# Patient Record
Sex: FEMALE | Race: WHITE
[De-identification: names, ages, dates, MRNs, and addresses within clinical notes are randomized per-mention and may not be internally consistent; named-entity substitution may affect disease eponyms.]

---

## 2020-06-06 ENCOUNTER — HOSPITAL ENCOUNTER (EMERGENCY)
Dept: HOSPITAL 96 - M.ERS | Age: 50
Discharge: HOME | End: 2020-06-06
Payer: COMMERCIAL

## 2020-06-06 VITALS — SYSTOLIC BLOOD PRESSURE: 141 MMHG | DIASTOLIC BLOOD PRESSURE: 70 MMHG

## 2020-06-06 VITALS — WEIGHT: 175 LBS | BODY MASS INDEX: 33.04 KG/M2 | HEIGHT: 61 IN

## 2020-06-06 DIAGNOSIS — F17.210: ICD-10-CM
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
## 2020-06-06 DIAGNOSIS — Z98.0: ICD-10-CM

## 2020-06-06 DIAGNOSIS — R56.9: ICD-10-CM
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
## 2020-06-06 DIAGNOSIS — Z88.8: ICD-10-CM

## 2020-06-06 DIAGNOSIS — S40.861A: ICD-10-CM

## 2020-06-06 DIAGNOSIS — Z91.09: ICD-10-CM

## 2020-06-06 DIAGNOSIS — L03.113: Primary | ICD-10-CM

## 2020-08-18 ENCOUNTER — HOSPITAL ENCOUNTER (OUTPATIENT)
Dept: HOSPITAL 96 - M.MRI | Age: 50
End: 2020-08-18
Attending: FAMILY MEDICINE
Payer: COMMERCIAL

## 2020-08-18 DIAGNOSIS — R60.0: ICD-10-CM

## 2020-08-18 DIAGNOSIS — M25.411: Primary | ICD-10-CM

## 2020-08-18 DIAGNOSIS — M25.511: ICD-10-CM

## 2021-05-18 ENCOUNTER — HOSPITAL ENCOUNTER (INPATIENT)
Dept: HOSPITAL 96 - M.ERS | Age: 51
LOS: 2 days | Discharge: HOME | DRG: 246 | End: 2021-05-20
Attending: INTERNAL MEDICINE | Admitting: INTERNAL MEDICINE
Payer: COMMERCIAL

## 2021-05-18 VITALS — DIASTOLIC BLOOD PRESSURE: 51 MMHG | SYSTOLIC BLOOD PRESSURE: 98 MMHG

## 2021-05-18 VITALS — WEIGHT: 193 LBS | HEIGHT: 60.98 IN | BODY MASS INDEX: 36.44 KG/M2

## 2021-05-18 VITALS — SYSTOLIC BLOOD PRESSURE: 118 MMHG | DIASTOLIC BLOOD PRESSURE: 51 MMHG

## 2021-05-18 VITALS — DIASTOLIC BLOOD PRESSURE: 46 MMHG | SYSTOLIC BLOOD PRESSURE: 95 MMHG

## 2021-05-18 VITALS — DIASTOLIC BLOOD PRESSURE: 70 MMHG | SYSTOLIC BLOOD PRESSURE: 157 MMHG

## 2021-05-18 VITALS — SYSTOLIC BLOOD PRESSURE: 130 MMHG | DIASTOLIC BLOOD PRESSURE: 66 MMHG

## 2021-05-18 DIAGNOSIS — G43.909: ICD-10-CM

## 2021-05-18 DIAGNOSIS — E11.9: ICD-10-CM

## 2021-05-18 DIAGNOSIS — Z82.49: ICD-10-CM

## 2021-05-18 DIAGNOSIS — Z88.8: ICD-10-CM

## 2021-05-18 DIAGNOSIS — F17.200: ICD-10-CM

## 2021-05-18 DIAGNOSIS — I21.4: Primary | ICD-10-CM

## 2021-05-18 DIAGNOSIS — I50.33: ICD-10-CM

## 2021-05-18 DIAGNOSIS — I25.110: ICD-10-CM

## 2021-05-18 DIAGNOSIS — I35.1: ICD-10-CM

## 2021-05-18 DIAGNOSIS — M79.7: ICD-10-CM

## 2021-05-18 DIAGNOSIS — Z20.822: ICD-10-CM

## 2021-05-18 DIAGNOSIS — E78.5: ICD-10-CM

## 2021-05-18 DIAGNOSIS — Z79.899: ICD-10-CM

## 2021-05-18 DIAGNOSIS — Z90.49: ICD-10-CM

## 2021-05-18 LAB
ABSOLUTE BASOPHILS: 0.1 THOU/UL (ref 0–0.2)
ABSOLUTE EOSINOPHILS: 0.1 THOU/UL (ref 0–0.7)
ABSOLUTE MONOCYTES: 0.5 THOU/UL (ref 0–1.2)
ALBUMIN SERPL-MCNC: 3.5 G/DL (ref 3.4–5)
ALP SERPL-CCNC: 119 U/L (ref 46–116)
ALT SERPL-CCNC: 46 U/L (ref 30–65)
ANION GAP SERPL CALC-SCNC: 9 MMOL/L (ref 7–16)
APTT BLD: 25.2 SECONDS (ref 25–31.3)
AST SERPL-CCNC: 22 U/L (ref 15–37)
BASOPHILS NFR BLD AUTO: 1.1 %
BILIRUB SERPL-MCNC: 0.3 MG/DL
BUN SERPL-MCNC: 16 MG/DL (ref 7–18)
CALCIUM SERPL-MCNC: 9 MG/DL (ref 8.5–10.1)
CHLORIDE SERPL-SCNC: 101 MMOL/L (ref 98–107)
CHOLEST SERPL-MCNC: 222 MG/DL (ref ?–200)
CK-MB MASS: 1.1 NG/ML
CO2 SERPL-SCNC: 26 MMOL/L (ref 21–32)
CREAT SERPL-MCNC: 1.1 MG/DL (ref 0.6–1.3)
EOSINOPHIL NFR BLD: 1 %
GLUCOSE SERPL-MCNC: 337 MG/DL (ref 70–99)
GRANULOCYTES NFR BLD MANUAL: 71.3 %
HCT VFR BLD CALC: 49.8 % (ref 37–47)
HDLC SERPL-MCNC: 26 MG/DL (ref 40–?)
HGB BLD-MCNC: 17 GM/DL (ref 12–15)
INR PPP: 1
LDLC SERPL-MCNC: 140 MG/DL (ref ?–100)
LIPASE: 325 U/L (ref 73–393)
LYMPHOCYTES # BLD: 2 THOU/UL (ref 0.8–5.3)
LYMPHOCYTES NFR BLD AUTO: 21.1 %
MAGNESIUM SERPL-MCNC: 2 MG/DL (ref 1.8–2.4)
MCH RBC QN AUTO: 31.6 PG (ref 26–34)
MCHC RBC AUTO-ENTMCNC: 34.2 G/DL (ref 28–37)
MCV RBC: 92.4 FL (ref 80–100)
MONOCYTES NFR BLD: 5.5 %
MPV: 7.8 FL. (ref 7.2–11.1)
NEUTROPHILS # BLD: 6.7 THOU/UL (ref 1.6–8.1)
NT-PRO BRAIN NAT PEPTIDE: 71 PG/ML (ref ?–300)
NUCLEATED RBCS: 0 /100WBC
PLATELET COUNT*: 264 THOU/UL (ref 150–400)
POTASSIUM SERPL-SCNC: 4.4 MMOL/L (ref 3.5–5.1)
PROT SERPL-MCNC: 8.3 G/DL (ref 6.4–8.2)
PROTHROMBIN TIME: 10.3 SECONDS (ref 9.2–11.5)
RBC # BLD AUTO: 5.39 MIL/UL (ref 4.2–5)
RDW-CV: 12.9 % (ref 10.5–14.5)
SODIUM SERPL-SCNC: 136 MMOL/L (ref 136–145)
TC:HDL: 8.5 RATIO
TRIGL SERPL-MCNC: 280 MG/DL (ref ?–150)
VLDLC SERPL CALC-MCNC: 56 MG/DL (ref ?–40)
WBC # BLD AUTO: 9.3 THOU/UL (ref 4–11)

## 2021-05-18 NOTE — EKG
[place] [address]
Phone:  [phone]                     ELECTROCARDIOGRAM REPORT      
_______________________________________________________________________________
 
Name:         ROSENJAKUB             Room:          [address]    ADM IN 
M.R.#:    [id number]     Account #:     [id number]  
Admission:    21    Attend Phys:   Dajuan Espinoza
Discharge:                Date of Birth: [birth year]  
Date of Service: 21 0909  Report #:      [id number]
        [id number]
_______________________________________________________________________________
THIS REPORT FOR:  //name//                      
 
                         [facility] ED
                                       
Test Date:    2021               Test Time:    09:09:23
Pat Name:     JAKUB ROSEN            Department:   
Patient ID:   [id number]            Room:         [facility]
Gender:       F                        Technician:   NIKITA
:          1970               Requested By: Antonio Payne
Order Number: [id number] MD:   Gabino Bonilla
                                 Measurements
Intervals                              Axis          
Rate:         77                       P:            30
NJ:           171                      QRS:          68
QRSD:         90                       T:            34
QT:           395                                    
QTc:          448                                    
                           Interpretive Statements
Sinus rhythm
ST elevation suggests acute pericarditis
Compared to ECG 10/25/2017 14:06:21
ST (T wave) deviation now present
Sinus tachycardia no longer present
Electronically Signed On 2021 16:11:55 CDT by Gabino Bonilla
[email or web address]
 
 
 
 
 
 
 
 
 
 
 
 
 
 
 
 
 
 
 
  <ELECTRONICALLY SIGNED>
                                           By: Gabino Bonilla MD, [facility]      
  21     1611
D: 2109   _____________________________________
T: 21 0909   Gabino Bonilla MD, [facility]        /EPI

## 2021-05-18 NOTE — 2DMMODE
[place] [address]
Phone:  [phone] 2 D/M-MODE ECHOCARDIOGRAM     
_______________________________________________________________________________
 
Name:         JAKUB ROSEN             Room:          [address] [place] 
.R.#:    [id number]     Account #:     [id number]  
Admission:    21    Attend Phys:   Dajuan Espinoza
Discharge:                Date of Birth: [birth year]  
Date of Service: 21 1723  Report #:      [id number]
        [id number]
_______________________________________________________________________________
THIS REPORT FOR:
 
cc:  Emmy Delgadillo,Emmy Branch,Gabino SERRATO MD [facility]        
                                                                       ~
 
--------------- APPROVED REPORT --------------
 
 
Study performed:  2021 14:37:37
 
EXAM: Comprehensive 2D, Doppler, and color-flow 
Echocardiogram 
Patient Location: In-Patient   
Room #:  209     Status:  routine
 
      BSA:         1.74
HR: 64 bpm BP:          130/80 mmHg 
Rhythm: NSR     
 
Other Information 
Study Quality: Good
 
Indications
Chest Pain
 
2D Dimensions
IVSd:  8.71 (7-11mm) LVOT Diam:  19.13 (18-24mm) 
LVDd:  36.83 mm  
PWd:  7.96 (7-11mm) Ascending Ao:  25.24 (22-36mm)
LVDs:  23.95 (25-40mm) 
Aortic Root:  31.08 mm 
 
Volumes
Left Atrial Volume (Systole) 
    LA ESV Index:  15.10 mL/m2
 
Aortic Valve
AoV Peak Steve.:  1.15 m/s 
AO Peak Gr.:  5.28 mmHg  LVOT Max PG:  3.78 mmHg
AO Mean Gr.:  3.02 mmHg  LVOT Mean P.64 mmHg
    LVOT Max V:  0.97 m/s
AO V2 VTI:  23.86 cm  LVOT Mean V:  0.58 m/s
ASHLEY (VTI):  2.55 cm2  LVOT V1 VTI:  21.13 cm
AI [place]:  3.39 m/s2  
 
 
[place] [address]
Phone:  [phone]                     2 D/M-MODE ECHOCARDIOGRAM     
_______________________________________________________________________________
 
Name:         ROSENJAKUBAWA BOYD             Room:          [address] [place] 
M.R.#:    [id number]     Account #:     [id number]  
Admission:    21    Attend Phys:   Dajuan Espinoza
Discharge:                Date of Birth: [birth year]  
Date of Service: 21 1723  Report #:      [id number]
        [id number]
_______________________________________________________________________________
AI PHT:  356.28 ms  
 
Mitral Valve
    E/A Ratio:  1.10
    MV Decel. Time:  194.76 ms
MV E Max Steve.:  0.93 m/s 
MV PHT:  56.48 ms  
MVA (PHT):  3.90 cm2  
 
TDI
E/Lateral E':  8.45 E/Medial E':  9.30
   Medial E' Steve.:  0.10 m/s
   Lateral E' Steve.:  0.11 m/s
 
Pulmonary Valve
PV Peak Steve.:  0.65 m/s PV Peak Gr.:  1.69 mmHg
 
Tricuspid Valve
    RAP Estimate:  5.00 mmHg
TR Peak Gr.:  18.83 mmHg RVSP:  23.00 mmHg
    PA Pressure:  23.00 mmHg
 
Left Ventricle
The left ventricle is normal size. There is normal LV segmental wall 
motion. There is normal left ventricular wall thickness. Left 
ventricular systolic function is normal. The left ventricular 
ejection fraction is within the normal range. LVEF is 55-60%. The 
left ventricular diastolic function is normal.
 
Right Ventricle
The right ventricle is normal size. The right ventricular systolic 
function is normal.
 
Atria
The left atrium size is normal. The right atrium size is 
normal.
 
Aortic Valve
The aortic valve is normal in structure. Mild aortic regurgitation. 
There is no aortic valvular stenosis.
 
Mitral Valve
The mitral valve is normal in structure. Mild mitral regurgitation. 
No evidence of mitral valve stenosis.
 
Tricuspid Valve
 
 
[place] [address]
Phone:  [phone]                     2 D/M-MODE ECHOCARDIOGRAM     
_______________________________________________________________________________
 
Name:         JAKUB ROSEN             Room:          [address] [place] 
[facility]#:    [id number]     Account #:     [id number]  
Admission:    21    Attend Phys:   Dajuan Espinoza
Discharge:                Date of Birth: [birth year]  
Date of Service: 21 1723  Report #:      [id number]
        [id number]
_______________________________________________________________________________
The tricuspid valve is normal in structure. Trace tricuspid 
regurgitation. No pulmonary hypertension.
 
Pulmonic Valve
The pulmonary valve is normal in structure. There is no pulmonic 
valvular regurgitation.
 
Great Vessels
The aortic root is normal in size. IVC is normal in size and 
collapses >50% with inspiration.
 
Pericardium
There is no pericardial effusion.
 
<Conclusion>
LVEF is 55-60%.
Mild aortic regurgitation.
Mild mitral regurgitation.
 
 
 
 
 
 
 
 
 
 
 
 
 
 
 
 
 
 
 
 
 
 
 
 
 
 
  <ELECTRONICALLY SIGNED>
                                           By: Gabino Bonilla MD, FACC      
  21     1723
D: 21   _____________________________________
T: 21   Gabino Bonilla MD, FACC        /INF

## 2021-05-19 VITALS — DIASTOLIC BLOOD PRESSURE: 82 MMHG | SYSTOLIC BLOOD PRESSURE: 174 MMHG

## 2021-05-19 VITALS — DIASTOLIC BLOOD PRESSURE: 93 MMHG | SYSTOLIC BLOOD PRESSURE: 155 MMHG

## 2021-05-19 VITALS — DIASTOLIC BLOOD PRESSURE: 59 MMHG | SYSTOLIC BLOOD PRESSURE: 127 MMHG

## 2021-05-19 VITALS — SYSTOLIC BLOOD PRESSURE: 150 MMHG | DIASTOLIC BLOOD PRESSURE: 82 MMHG

## 2021-05-19 VITALS — SYSTOLIC BLOOD PRESSURE: 142 MMHG | DIASTOLIC BLOOD PRESSURE: 79 MMHG

## 2021-05-19 VITALS — DIASTOLIC BLOOD PRESSURE: 82 MMHG | SYSTOLIC BLOOD PRESSURE: 160 MMHG

## 2021-05-19 VITALS — SYSTOLIC BLOOD PRESSURE: 159 MMHG | DIASTOLIC BLOOD PRESSURE: 88 MMHG

## 2021-05-19 VITALS — DIASTOLIC BLOOD PRESSURE: 86 MMHG | SYSTOLIC BLOOD PRESSURE: 166 MMHG

## 2021-05-19 VITALS — DIASTOLIC BLOOD PRESSURE: 64 MMHG | SYSTOLIC BLOOD PRESSURE: 152 MMHG

## 2021-05-19 VITALS — DIASTOLIC BLOOD PRESSURE: 79 MMHG | SYSTOLIC BLOOD PRESSURE: 166 MMHG

## 2021-05-19 VITALS — SYSTOLIC BLOOD PRESSURE: 154 MMHG | DIASTOLIC BLOOD PRESSURE: 103 MMHG

## 2021-05-19 VITALS — DIASTOLIC BLOOD PRESSURE: 101 MMHG | SYSTOLIC BLOOD PRESSURE: 152 MMHG

## 2021-05-19 VITALS — SYSTOLIC BLOOD PRESSURE: 152 MMHG | DIASTOLIC BLOOD PRESSURE: 74 MMHG

## 2021-05-19 VITALS — SYSTOLIC BLOOD PRESSURE: 167 MMHG | DIASTOLIC BLOOD PRESSURE: 99 MMHG

## 2021-05-19 VITALS — DIASTOLIC BLOOD PRESSURE: 66 MMHG | SYSTOLIC BLOOD PRESSURE: 149 MMHG

## 2021-05-19 LAB
ABSOLUTE BASOPHILS: 0.1 THOU/UL (ref 0–0.2)
ABSOLUTE EOSINOPHILS: 0.1 THOU/UL (ref 0–0.7)
ABSOLUTE MONOCYTES: 0.6 THOU/UL (ref 0–1.2)
ANION GAP SERPL CALC-SCNC: 6 MMOL/L (ref 7–16)
BASOPHILS NFR BLD AUTO: 0.6 %
BUN SERPL-MCNC: 14 MG/DL (ref 7–18)
CALCIUM SERPL-MCNC: 8.5 MG/DL (ref 8.5–10.1)
CHLORIDE SERPL-SCNC: 104 MMOL/L (ref 98–107)
CO2 SERPL-SCNC: 28 MMOL/L (ref 21–32)
CREAT SERPL-MCNC: 0.9 MG/DL (ref 0.6–1.3)
EOSINOPHIL NFR BLD: 0.8 %
GLUCOSE SERPL-MCNC: 223 MG/DL (ref 70–99)
GRANULOCYTES NFR BLD MANUAL: 63.9 %
HCT VFR BLD CALC: 43.5 % (ref 37–47)
HGB BLD-MCNC: 14.7 GM/DL (ref 12–15)
LYMPHOCYTES # BLD: 2.5 THOU/UL (ref 0.8–5.3)
LYMPHOCYTES NFR BLD AUTO: 28.5 %
MAGNESIUM SERPL-MCNC: 1.9 MG/DL (ref 1.8–2.4)
MCH RBC QN AUTO: 31.4 PG (ref 26–34)
MCHC RBC AUTO-ENTMCNC: 33.9 G/DL (ref 28–37)
MCV RBC: 92.5 FL (ref 80–100)
MONOCYTES NFR BLD: 6.2 %
MPV: 8.4 FL. (ref 7.2–11.1)
NEUTROPHILS # BLD: 5.7 THOU/UL (ref 1.6–8.1)
NUCLEATED RBCS: 0 /100WBC
PHOSPHATE SERPL-MCNC: 3.7 MG/DL (ref 2.5–4.9)
PLATELET COUNT*: 248 THOU/UL (ref 150–400)
POTASSIUM SERPL-SCNC: 4.4 MMOL/L (ref 3.5–5.1)
RBC # BLD AUTO: 4.7 MIL/UL (ref 4.2–5)
RDW-CV: 12.7 % (ref 10.5–14.5)
SODIUM SERPL-SCNC: 138 MMOL/L (ref 136–145)
TROPONIN-I LEVEL: 0.33 NG/ML (ref ?–0.06)
WBC # BLD AUTO: 8.9 THOU/UL (ref 4–11)

## 2021-05-19 PROCEDURE — 3E033PZ INTRODUCTION OF PLATELET INHIBITOR INTO PERIPHERAL VEIN, PERCUTANEOUS APPROACH: ICD-10-PCS | Performed by: INTERNAL MEDICINE

## 2021-05-19 PROCEDURE — B2111ZZ FLUOROSCOPY OF MULTIPLE CORONARY ARTERIES USING LOW OSMOLAR CONTRAST: ICD-10-PCS | Performed by: INTERNAL MEDICINE

## 2021-05-19 PROCEDURE — B2151ZZ FLUOROSCOPY OF LEFT HEART USING LOW OSMOLAR CONTRAST: ICD-10-PCS | Performed by: INTERNAL MEDICINE

## 2021-05-19 PROCEDURE — 4A023N7 MEASUREMENT OF CARDIAC SAMPLING AND PRESSURE, LEFT HEART, PERCUTANEOUS APPROACH: ICD-10-PCS | Performed by: INTERNAL MEDICINE

## 2021-05-19 PROCEDURE — 027034Z DILATION OF CORONARY ARTERY, ONE ARTERY WITH DRUG-ELUTING INTRALUMINAL DEVICE, PERCUTANEOUS APPROACH: ICD-10-PCS | Performed by: INTERNAL MEDICINE

## 2021-05-19 NOTE — CARD
[address] [place]  [address]                    CARDIAC CATH REPORT           
_______________________________________________________________________________
 
Name:       JAKUB ROSEN              Room:           [address]    ADM [place]  
.R.#:  [id number]      Account #:      [id number]  
Admission:  05/18/21     Attend Phys:    MARLON Velazquez
Discharge:               Date of Birth:  [birth year]  
         Report #: [id number]
                                                                     [id number]
_______________________________________________________________________________
THIS REPORT FOR:  
 
cc:  Emmy Delgadillo Linda J. DO Blick, David R. MD [facility]                                            ~
 
 
--------------- APPROVED REPORT --------------
 
 
Study performed:  05/19/2021 09:51:18
 
Patient Details
Patient Status: In-Patient                  Room #: 209
The patient is a 50 year-old female
 
Event Personnel
Gabino Bonilla  Interventional Cardiologist, Tristan Nieves RN 
Circulator, Paolo Salcedo RTR Scrub, Madonna Escalona RTR 
Monitor
 
Procedures Performed
Art Access - R femoral artery,  Left Heart Cath w/or w/o Coronaries 
LHC, and aortic root injection.  LACY Place w/wo Plasty Single RCA ,
Hemostasis w/ Angioseal
 
Indication
Unstable angina , Chest pain
 
Risk Factors
Hypercholesterolemia, Hypertension, Diabetes Tobacco History 
()
 
Admission/Lab Medications/Medications given during 
procedure
Glycoprotein IllbIlla Inhibitors, Heparin Unfract., Heparin IV 6000 
units, Aggrastat IV bolus 8.8 ml, Nitroglycerin  mcg, Effient 
PO 60 mg
 
Procedure Narrative
The patient was brought electively to the Cardiac Catheterization 
Laboratory and was prepped and draped in a sterile manner. The right 
femoral was infiltrated with 2% Lidocaine subcutaneous anesthesia. IV 
conscious sedation was used throughout procedure with appropriate 
monitoring and was performed in the presence of a registered nurse 
who was an independent trained observer other than the physician 
 
 
 
[place] [address]                    CARDIAC CATH REPORT           
_______________________________________________________________________________
 
Name:       JAKUB ROSEN              Room:           [address] [place]  
[facility]#:  [id number]      Account #:      [id number]  
Admission:  05/18/21     Attend Phys:    MARLON Velaqzuez
Discharge:               Date of Birth:  [birth year]  
         Report #: [id number]
                                                                     [id number]
_______________________________________________________________________________
 
performing the procedure. A 6F Baring sheath was inserted into the 
right femoral artery. Coronary angiography was performed using 
coronary diagnostic catheters. The right coronary system was accessed 
and visualized with a 6F JR4 catheter. The left coronary system was 
accessed and visualized with a 6F JL4 catheter. The left ventricle 
was accessed and visualized with a 6F Pigtail catheter. Left 
ventricular/Aortic Valve gradient assessed via catheter pullback. 
Left ventriculogram was performed in BROWN projection. An aortogram of 
the ascending aorta was performed. Closure device was deployed with a 
6 Fr Angioseal. The patient tolerated the procedure well and there 
were no complications associated with the procedure. There was no 
hematoma. Attempted procedure from the right radial artery, but 
unable to place a sheath in the right radial artery. It was decided 
to proceed from the right femoral artery.  Aortic root injection 
performed using the pigtail catheter.
 
Intraoperative Conscious Sedation
Sedation start time:  10:26           Case end Time:  
11:19    
Fentanyl  75 mcg    Versed  4 mg  
 
Fluoro Time:    4.7 minutes     
Dose:     DAP 03889 cGycm2  1034 mGy  
Contrast Type and Amount:  Omnipaque 185 ml    
 
Coronary Angiography
The patient's coronary anatomy is right dominant. 
 
Diagnostic Cath
Left Main 0% stenosis
LAD  40% mid stenosis
Circumflex 40% mid stenosis
OM3  60% ostial stenosis in medium sized vessel
Right Coronary 99% mid stenosis
 
Left Ventriculography
The left ventricular ejection fraction is estimated to be 60-65%. 
Left ventricular wall motion abnormalities are not present. There is 
no mitral insufficiency. Moderate aortic insufficiency 
noted.
 
Hemodynamics
The aortic pressure is 147/75 mmHg with a mean of 105 mmHg. The left 
ventricular pressure is 149/10 mmHg with a mean of mmHg. The left 
ventricular end diastolic pressure is 20 mmHg. There was no gradient 
 
 
 
[place] [address]                    CARDIAC CATH REPORT           
_______________________________________________________________________________
 
Name:       JAKUB ROSEN              Room:           [address]#:  [id number]      Account #:      [id number]  
Admission:  05/18/21     Attend Phys:    MARLON Velazquez
Discharge:               Date of Birth:  [birth year]  
         Report #: [id number]
                                                                     [id number]
_______________________________________________________________________________
 
across the aortic valve upon pullback. Pullback from the left 
ventricle to the aorta revealed no gradient across the aortic 
valve.
 
PCI Technique Lesion
Anticoagulation was achieved with Heparin. Aggrastat IV bolus 8.8 ml 
given. Percutaneous coronary intervention was performed on the mid 
right coronary artery. The lesion stenosis prior to intervention was 
99% with AZUL 3 flow. A 6F JR 4.0 Guide Catheter was used to engage 
the right ostium. A BMW 190cm Interventional Guidewire was used to 
cross the lesion.
 
BALLOON DILATION
A Balloon catheter Trek RX 2.5 X 12 was inserted and inflated up to 
12.00atm for 13seconds. Repeat angiography revealed the following 
post-dilatation results: 50% stenosis. Additional Inflation: 18.00atm 
for 14seconds.
 
STENT DEPLOYMENT
A drug-eluting stent [place] RX Stent  3.0X18mm was inserted and 
inflated up to 9.00atm for 14seconds. Repeat angiography revealed the 
following post-stent deployment results: 0% stenosis. Additional 
Inflation: 12.00atm for 9seconds.
 
Final angiography reveals 0 % stenosis with AZUL 3 
flow.
 
Conclusion
1.  99% stenosis of the mid RCA
2.  LVEF 60-65%
3.  Moderate aortic insufficiency
4.  successful placement of a drug eluting stent in the RCA 
 
Recommendations
Smoking Cessation
 
Medications Administered
Prasugrel
 
 
 
 
 
<ELECTRONICALLY SIGNED>
                                        By:  Gabino Bonilla MD, [facility]      
05/19/21 1210
D: 05/19/21 1210_______________________________________
T: 05/19/21 1210Dayvonne Bonilla MD, FAC         /INF

## 2021-05-20 VITALS — SYSTOLIC BLOOD PRESSURE: 122 MMHG | DIASTOLIC BLOOD PRESSURE: 54 MMHG

## 2021-05-20 VITALS — SYSTOLIC BLOOD PRESSURE: 134 MMHG | DIASTOLIC BLOOD PRESSURE: 61 MMHG

## 2021-05-20 VITALS — SYSTOLIC BLOOD PRESSURE: 139 MMHG | DIASTOLIC BLOOD PRESSURE: 67 MMHG

## 2021-05-20 VITALS — SYSTOLIC BLOOD PRESSURE: 174 MMHG | DIASTOLIC BLOOD PRESSURE: 82 MMHG

## 2021-05-20 VITALS — DIASTOLIC BLOOD PRESSURE: 82 MMHG | SYSTOLIC BLOOD PRESSURE: 174 MMHG

## 2021-05-20 LAB
ANION GAP SERPL CALC-SCNC: 7 MMOL/L (ref 7–16)
APTT BLD: 24.9 SECONDS (ref 25–31.3)
BUN SERPL-MCNC: 13 MG/DL (ref 7–18)
CALCIUM SERPL-MCNC: 8 MG/DL (ref 8.5–10.1)
CHLORIDE SERPL-SCNC: 107 MMOL/L (ref 98–107)
CO2 SERPL-SCNC: 27 MMOL/L (ref 21–32)
CREAT SERPL-MCNC: 1 MG/DL (ref 0.6–1.3)
GLUCOSE SERPL-MCNC: 184 MG/DL (ref 70–99)
HCT VFR BLD CALC: 40.6 % (ref 37–47)
HGB BLD-MCNC: 14 GM/DL (ref 12–15)
INR PPP: 1
MCH RBC QN AUTO: 31.8 PG (ref 26–34)
MCHC RBC AUTO-ENTMCNC: 34.4 G/DL (ref 28–37)
MCV RBC: 92.6 FL (ref 80–100)
MPV: 8 FL. (ref 7.2–11.1)
PLATELET COUNT*: 212 THOU/UL (ref 150–400)
POTASSIUM SERPL-SCNC: 4.2 MMOL/L (ref 3.5–5.1)
PROTHROMBIN TIME: 10.8 SECONDS (ref 9.2–11.5)
RBC # BLD AUTO: 4.39 MIL/UL (ref 4.2–5)
RDW-CV: 12.8 % (ref 10.5–14.5)
SODIUM SERPL-SCNC: 141 MMOL/L (ref 136–145)
WBC # BLD AUTO: 8.9 THOU/UL (ref 4–11)

## 2021-05-20 NOTE — EKG
[place] [address]
Phone:  [phone]                     ELECTROCARDIOGRAM REPORT      
_______________________________________________________________________________
 
Name:         SILASJAKUB OLIVER             Room:          [address]    ADM IN 
M.R.#:    [id number]     Account #:     [id number]  
Admission:    21    Attend Phys:   Dajuan Espinoza
Discharge:                Date of Birth: [birth year]  
Date of Service: 2131  Report #:      [id number]
        [id number]
_______________________________________________________________________________
THIS REPORT FOR:  //name//                      
 
                          [facility]
                                       
Test Date:    2021               Test Time:    09:31:42
Pat Name:     JAKUB ROSEN            Department:   
Patient ID:   [id number]            Room:         [address]
Gender:       F                        Technician:   ERENDIRA
:          1970               Requested By: Ligia Little
Order Number: [id number]    Solomon MD:   Gabino Bonilla
                                 Measurements
Intervals                              Axis          
Rate:         69                       P:            64
ND:           167                      QRS:          65
QRSD:         92                       T:            38
QT:           385                                    
QTc:          413                                    
                           Interpretive Statements
Sinus rhythm
Compared to ECG 2021 09:09:23
ST (T wave) deviation no longer present
Electronically Signed On 2021 11:06:38 CDT by Gabino Bonilla
[email or web address]
 
 
 
 
 
 
 
 
 
 
 
 
 
 
 
 
 
 
 
 
 
  <ELECTRONICALLY SIGNED>
                                           By: Gabino Bonilla MD, [facility]      
  21     1106
D: 2131   _____________________________________
T: 2131   Gabino Bonilla MD, [facility]        /EPI

## 2021-05-21 LAB
EST. AVERAGE GLUCOSE BLD GHB EST-MCNC: 272 MG/DL
GLYCOHEMOGLOBIN (HGB A1C): 11.1 % (ref 4.8–5.6)

## 2021-08-20 ENCOUNTER — HOSPITAL ENCOUNTER (EMERGENCY)
Dept: HOSPITAL 96 - M.ERS | Age: 51
Discharge: HOME | End: 2021-08-20
Payer: COMMERCIAL

## 2021-08-20 VITALS — DIASTOLIC BLOOD PRESSURE: 72 MMHG | SYSTOLIC BLOOD PRESSURE: 111 MMHG

## 2021-08-20 VITALS — WEIGHT: 150 LBS | HEIGHT: 61 IN | BODY MASS INDEX: 28.32 KG/M2

## 2021-08-20 DIAGNOSIS — Z98.51: ICD-10-CM

## 2021-08-20 DIAGNOSIS — F17.210: ICD-10-CM

## 2021-08-20 DIAGNOSIS — Z88.8: ICD-10-CM

## 2021-08-20 DIAGNOSIS — Z90.49: ICD-10-CM

## 2021-08-20 DIAGNOSIS — K04.7: Primary | ICD-10-CM
